# Patient Record
Sex: FEMALE | Race: WHITE | NOT HISPANIC OR LATINO | Employment: FULL TIME | URBAN - NONMETROPOLITAN AREA
[De-identification: names, ages, dates, MRNs, and addresses within clinical notes are randomized per-mention and may not be internally consistent; named-entity substitution may affect disease eponyms.]

---

## 2021-04-30 PROBLEM — F41.1 GENERALIZED ANXIETY DISORDER: Status: ACTIVE | Noted: 2021-04-30

## 2021-06-24 ENCOUNTER — TELEPHONE (OUTPATIENT)
Dept: INTERNAL MEDICINE | Facility: CLINIC | Age: 26
End: 2021-06-24

## 2021-06-24 DIAGNOSIS — F90.9 ATTENTION DEFICIT HYPERACTIVITY DISORDER (ADHD), UNSPECIFIED ADHD TYPE: Primary | ICD-10-CM

## 2021-06-24 NOTE — TELEPHONE ENCOUNTER
If it didn't start until 3 weeks after starting the medication then I don't think it is the medication. If it started when the medication was started then it could be. It sounds like we should add something for anxiety and stress. If she would like to add Pristiq daily I am happy to do that.

## 2021-06-24 NOTE — TELEPHONE ENCOUNTER
Pt states she has moments of clarity taking the ritalin but she has noticed it's Not really effective. she says she is irritable and impatient when the medication wears off. Wondering if maybe this isn't the best medication for her?     Patient also said she is going to do some research on the pristiq before she makes any decisions.     Please advise?

## 2021-06-24 NOTE — TELEPHONE ENCOUNTER
Patient states she started taking ritalin on June 3rd. She has been very emotional and could cry for any reason about 3 weeks after starting medication. She has been more anxious and paranoid when going out. She did not know if this medication was causing this or if this is unrelated.

## 2021-06-28 RX ORDER — LISDEXAMFETAMINE DIMESYLATE CAPSULES 20 MG/1
20 CAPSULE ORAL EVERY MORNING
Qty: 30 CAPSULE | Refills: 0 | Status: SHIPPED | OUTPATIENT
Start: 2021-06-28 | End: 2021-07-06

## 2023-04-25 ENCOUNTER — TRANSCRIBE ORDERS (OUTPATIENT)
Dept: LAB | Facility: HOSPITAL | Age: 28
End: 2023-04-25
Payer: COMMERCIAL

## 2023-04-25 ENCOUNTER — LAB (OUTPATIENT)
Dept: LAB | Facility: HOSPITAL | Age: 28
End: 2023-04-25
Payer: COMMERCIAL

## 2023-04-25 DIAGNOSIS — Z3A.28 28 WEEKS GESTATION OF PREGNANCY: Primary | ICD-10-CM

## 2023-04-25 DIAGNOSIS — Z34.83 PRENATAL CARE, SUBSEQUENT PREGNANCY, THIRD TRIMESTER: ICD-10-CM

## 2023-04-25 DIAGNOSIS — Z3A.28 28 WEEKS GESTATION OF PREGNANCY: ICD-10-CM

## 2023-04-25 LAB
BLD GP AB SCN SERPL QL: NEGATIVE
DEPRECATED RDW RBC AUTO: 40.9 FL (ref 37–54)
ERYTHROCYTE [DISTWIDTH] IN BLOOD BY AUTOMATED COUNT: 12.4 % (ref 12.3–15.4)
GLUCOSE 1H P 100 G GLC PO SERPL-MCNC: 138 MG/DL (ref 65–139)
GLUCOSE BLDC GLUCOMTR-MCNC: 142 MG/DL (ref 70–130)
HCT VFR BLD AUTO: 35.5 % (ref 34–46.6)
HGB BLD-MCNC: 12 G/DL (ref 12–15.9)
MCH RBC QN AUTO: 30 PG (ref 26.6–33)
MCHC RBC AUTO-ENTMCNC: 33.8 G/DL (ref 31.5–35.7)
MCV RBC AUTO: 88.8 FL (ref 79–97)
PLATELET # BLD AUTO: 212 10*3/MM3 (ref 140–450)
PMV BLD AUTO: 11.8 FL (ref 6–12)
RBC # BLD AUTO: 4 10*6/MM3 (ref 3.77–5.28)
WBC NRBC COR # BLD: 11.93 10*3/MM3 (ref 3.4–10.8)

## 2023-04-25 PROCEDURE — 85027 COMPLETE CBC AUTOMATED: CPT

## 2023-04-25 PROCEDURE — 36415 COLL VENOUS BLD VENIPUNCTURE: CPT

## 2023-04-25 PROCEDURE — 82962 GLUCOSE BLOOD TEST: CPT

## 2023-04-25 PROCEDURE — 86850 RBC ANTIBODY SCREEN: CPT

## 2023-04-25 PROCEDURE — 82962 GLUCOSE BLOOD TEST: CPT | Performed by: ADVANCED PRACTICE MIDWIFE

## 2023-04-25 PROCEDURE — 82950 GLUCOSE TEST: CPT

## 2023-07-20 ENCOUNTER — HOSPITAL ENCOUNTER (INPATIENT)
Facility: HOSPITAL | Age: 28
LOS: 2 days | Discharge: HOME | End: 2023-07-22
Attending: OBSTETRICS & GYNECOLOGY | Admitting: OBSTETRICS & GYNECOLOGY
Payer: COMMERCIAL

## 2023-07-20 PROBLEM — Z3A.40 40 WEEKS GESTATION OF PREGNANCY: Status: RESOLVED | Noted: 2023-07-20 | Resolved: 2023-07-20

## 2023-07-20 PROBLEM — Z3A.40 40 WEEKS GESTATION OF PREGNANCY: Status: ACTIVE | Noted: 2023-07-20

## 2023-07-20 LAB
ABO GROUP BLD: NORMAL
BLD GP AB SCN SERPL QL: NEGATIVE
DEPRECATED RDW RBC AUTO: 47.7 FL (ref 37–54)
ERYTHROCYTE [DISTWIDTH] IN BLOOD BY AUTOMATED COUNT: 14.7 % (ref 12.3–15.4)
HCT VFR BLD AUTO: 38.5 % (ref 34–46.6)
HGB BLD-MCNC: 12.8 G/DL (ref 12–15.9)
MCH RBC QN AUTO: 29.5 PG (ref 26.6–33)
MCHC RBC AUTO-ENTMCNC: 33.2 G/DL (ref 31.5–35.7)
MCV RBC AUTO: 88.7 FL (ref 79–97)
PLATELET # BLD AUTO: 185 10*3/MM3 (ref 140–450)
PMV BLD AUTO: 12.5 FL (ref 6–12)
RBC # BLD AUTO: 4.34 10*6/MM3 (ref 3.77–5.28)
RH BLD: POSITIVE
T&S EXPIRATION DATE: NORMAL
WBC NRBC COR # BLD: 11.16 10*3/MM3 (ref 3.4–10.8)

## 2023-07-20 PROCEDURE — 86900 BLOOD TYPING SEROLOGIC ABO: CPT | Performed by: OBSTETRICS & GYNECOLOGY

## 2023-07-20 PROCEDURE — 86850 RBC ANTIBODY SCREEN: CPT | Performed by: OBSTETRICS & GYNECOLOGY

## 2023-07-20 PROCEDURE — 36415 COLL VENOUS BLD VENIPUNCTURE: CPT | Performed by: OBSTETRICS & GYNECOLOGY

## 2023-07-20 PROCEDURE — 0KQM0ZZ REPAIR PERINEUM MUSCLE, OPEN APPROACH: ICD-10-PCS | Performed by: ADVANCED PRACTICE MIDWIFE

## 2023-07-20 PROCEDURE — 85027 COMPLETE CBC AUTOMATED: CPT | Performed by: OBSTETRICS & GYNECOLOGY

## 2023-07-20 PROCEDURE — 86901 BLOOD TYPING SEROLOGIC RH(D): CPT | Performed by: OBSTETRICS & GYNECOLOGY

## 2023-07-20 RX ORDER — ACETAMINOPHEN 325 MG/1
650 TABLET ORAL EVERY 6 HOURS PRN
Status: DISCONTINUED | OUTPATIENT
Start: 2023-07-20 | End: 2023-07-22 | Stop reason: HOSPADM

## 2023-07-20 RX ORDER — CARBOPROST TROMETHAMINE 250 UG/ML
250 INJECTION, SOLUTION INTRAMUSCULAR
Status: DISCONTINUED | OUTPATIENT
Start: 2023-07-20 | End: 2023-07-20 | Stop reason: HOSPADM

## 2023-07-20 RX ORDER — MISOPROSTOL 200 UG/1
800 TABLET ORAL ONCE AS NEEDED
Status: DISCONTINUED | OUTPATIENT
Start: 2023-07-20 | End: 2023-07-20 | Stop reason: HOSPADM

## 2023-07-20 RX ORDER — METHYLERGONOVINE MALEATE 0.2 MG/ML
200 INJECTION INTRAVENOUS ONCE AS NEEDED
Status: DISCONTINUED | OUTPATIENT
Start: 2023-07-20 | End: 2023-07-20 | Stop reason: HOSPADM

## 2023-07-20 RX ORDER — MAGNESIUM CARB/ALUMINUM HYDROX 105-160MG
30 TABLET,CHEWABLE ORAL ONCE
Status: DISCONTINUED | OUTPATIENT
Start: 2023-07-20 | End: 2023-07-20 | Stop reason: HOSPADM

## 2023-07-20 RX ORDER — LIDOCAINE HYDROCHLORIDE 10 MG/ML
5 INJECTION, SOLUTION EPIDURAL; INFILTRATION; INTRACAUDAL; PERINEURAL AS NEEDED
Status: DISCONTINUED | OUTPATIENT
Start: 2023-07-20 | End: 2023-07-20 | Stop reason: HOSPADM

## 2023-07-20 RX ORDER — IBUPROFEN 600 MG/1
600 TABLET ORAL EVERY 6 HOURS PRN
Status: DISCONTINUED | OUTPATIENT
Start: 2023-07-20 | End: 2023-07-22 | Stop reason: HOSPADM

## 2023-07-20 RX ORDER — OXYTOCIN/0.9 % SODIUM CHLORIDE 30/500 ML
PLASTIC BAG, INJECTION (ML) INTRAVENOUS
Status: DISCONTINUED
Start: 2023-07-20 | End: 2023-07-22 | Stop reason: HOSPADM

## 2023-07-20 RX ORDER — HYDROCORTISONE 25 MG/G
1 CREAM TOPICAL AS NEEDED
Status: DISCONTINUED | OUTPATIENT
Start: 2023-07-20 | End: 2023-07-22 | Stop reason: HOSPADM

## 2023-07-20 RX ORDER — PRENATAL WITH FERROUS FUM AND FOLIC ACID 3080; 920; 120; 400; 22; 1.84; 3; 20; 10; 1; 12; 200; 27; 25; 2 [IU]/1; [IU]/1; MG/1; [IU]/1; MG/1; MG/1; MG/1; MG/1; MG/1; MG/1; UG/1; MG/1; MG/1; MG/1; MG/1
1 TABLET ORAL DAILY
COMMUNITY

## 2023-07-20 RX ORDER — SODIUM CHLORIDE 0.9 % (FLUSH) 0.9 %
10 SYRINGE (ML) INJECTION EVERY 12 HOURS SCHEDULED
Status: DISCONTINUED | OUTPATIENT
Start: 2023-07-20 | End: 2023-07-20 | Stop reason: HOSPADM

## 2023-07-20 RX ORDER — BISACODYL 10 MG
10 SUPPOSITORY, RECTAL RECTAL DAILY PRN
Status: DISCONTINUED | OUTPATIENT
Start: 2023-07-21 | End: 2023-07-22 | Stop reason: HOSPADM

## 2023-07-20 RX ORDER — DOCUSATE SODIUM 100 MG/1
100 CAPSULE, LIQUID FILLED ORAL 2 TIMES DAILY
Status: DISCONTINUED | OUTPATIENT
Start: 2023-07-20 | End: 2023-07-22 | Stop reason: HOSPADM

## 2023-07-20 RX ORDER — FAMOTIDINE 10 MG/ML
20 INJECTION, SOLUTION INTRAVENOUS EVERY 12 HOURS PRN
Status: DISCONTINUED | OUTPATIENT
Start: 2023-07-20 | End: 2023-07-20 | Stop reason: HOSPADM

## 2023-07-20 RX ORDER — SODIUM CHLORIDE 0.9 % (FLUSH) 0.9 %
10 SYRINGE (ML) INJECTION AS NEEDED
Status: DISCONTINUED | OUTPATIENT
Start: 2023-07-20 | End: 2023-07-20 | Stop reason: HOSPADM

## 2023-07-20 RX ORDER — PRENATAL VIT/IRON FUM/FOLIC AC 27MG-0.8MG
1 TABLET ORAL DAILY
Status: DISCONTINUED | OUTPATIENT
Start: 2023-07-20 | End: 2023-07-22 | Stop reason: HOSPADM

## 2023-07-20 RX ORDER — OXYTOCIN/0.9 % SODIUM CHLORIDE 30/500 ML
250 PLASTIC BAG, INJECTION (ML) INTRAVENOUS CONTINUOUS
Status: DISPENSED | OUTPATIENT
Start: 2023-07-20 | End: 2023-07-20

## 2023-07-20 RX ORDER — SODIUM CHLORIDE 0.9 % (FLUSH) 0.9 %
1-10 SYRINGE (ML) INJECTION AS NEEDED
Status: DISCONTINUED | OUTPATIENT
Start: 2023-07-20 | End: 2023-07-22 | Stop reason: HOSPADM

## 2023-07-20 RX ORDER — ONDANSETRON 2 MG/ML
4 INJECTION INTRAMUSCULAR; INTRAVENOUS EVERY 6 HOURS PRN
Status: DISCONTINUED | OUTPATIENT
Start: 2023-07-20 | End: 2023-07-22 | Stop reason: HOSPADM

## 2023-07-20 RX ORDER — FAMOTIDINE 20 MG/1
20 TABLET, FILM COATED ORAL EVERY 12 HOURS PRN
Status: DISCONTINUED | OUTPATIENT
Start: 2023-07-20 | End: 2023-07-20 | Stop reason: HOSPADM

## 2023-07-20 RX ORDER — OXYTOCIN/0.9 % SODIUM CHLORIDE 30/500 ML
999 PLASTIC BAG, INJECTION (ML) INTRAVENOUS ONCE
Status: COMPLETED | OUTPATIENT
Start: 2023-07-20 | End: 2023-07-20

## 2023-07-20 RX ORDER — LIDOCAINE HYDROCHLORIDE 10 MG/ML
INJECTION, SOLUTION EPIDURAL; INFILTRATION; INTRACAUDAL; PERINEURAL
Status: DISCONTINUED
Start: 2023-07-20 | End: 2023-07-22 | Stop reason: HOSPADM

## 2023-07-20 RX ORDER — SODIUM CHLORIDE, SODIUM LACTATE, POTASSIUM CHLORIDE, CALCIUM CHLORIDE 600; 310; 30; 20 MG/100ML; MG/100ML; MG/100ML; MG/100ML
125 INJECTION, SOLUTION INTRAVENOUS CONTINUOUS
Status: DISCONTINUED | OUTPATIENT
Start: 2023-07-20 | End: 2023-07-20

## 2023-07-20 RX ADMIN — WITCH HAZEL 1 PAD: 500 SOLUTION RECTAL; TOPICAL at 16:03

## 2023-07-20 RX ADMIN — Medication 999 ML/HR: at 13:50

## 2023-07-20 RX ADMIN — PRENATAL VITAMINS-IRON FUMARATE 27 MG IRON-FOLIC ACID 0.8 MG TABLET 1 TABLET: at 16:03

## 2023-07-20 RX ADMIN — Medication: at 16:03

## 2023-07-20 RX ADMIN — DOCUSATE SODIUM 100 MG: 100 CAPSULE, LIQUID FILLED ORAL at 19:53

## 2023-07-20 RX ADMIN — Medication 1 APPLICATION: at 16:03

## 2023-07-20 RX ADMIN — IBUPROFEN 600 MG: 600 TABLET, FILM COATED ORAL at 14:52

## 2023-07-20 RX ADMIN — Medication 250 ML/HR: at 14:35

## 2023-07-20 NOTE — L&D DELIVERY NOTE
[facility]   Vaginal Delivery Note    Patient Name: Janet Francis  : 1995  MRN: [id number]    Date of Delivery: 2023     Diagnosis     Pre & Post-Delivery:  Intrauterine pregnancy at 40w2d  Labor status: Spontaneous Onset of Labor      (normal spontaneous vaginal delivery)             Problem List    Transfer to Postpartum     Review the Delivery Report for details.     Delivery     Delivery: Vaginal, Spontaneous     YOB: 2025    Time of Birth:  Gestational Age 1:39 PM   40w2d     Anesthesia: Local  - repair only   Delivering clinician: Gilmar Echols    Forceps?   No   Vacuum? No    Shoulder dystocia present: No        Delivery narrative:  Patient at 40w2d pushed to deliver a viable male infant over a second degree laceration with epidural anesthesia. Infant's head, tight anterior shoulder, and body delivered atraumatically. Vigorous infant was placed on mom's abdomen where the cord was allowed to stop pulsating and was clamped x2 and cut by FOB. Placenta delivered spontaneously and appeared to be intact. Laceration repaired with 3-0 Vicryl rapide and 3-0 Chromic. EBL 150ml. Mother and infant stable, bonding.         Infant     Findings: male  infant     Infant observations: Weight: 4235 g (9 lb 5.4 oz)   Length: 19  in  Observations/Comments:        Apgars: 7  @ 1 minute /    9  @ 5 minutes   Infant Name: Max     Placenta & Cord         Placenta delivered  Spontaneous  at   2023  1:49 PM     Cord: 3 vessels  present.   Nuchal Cord?  no   Cord blood obtained: Yes    Cord gases obtained:  No              Repair     Episiotomy: None         Lacerations: Yes  Laceration Information  Laceration Repaired?   Perineal: 2nd  Yes    Periurethral:       Labial:       Sulcus:       Vaginal:       Cervical:         Suture used for repair: 3-0 chromic gut, Vicryl rapide   Estimated Blood Loss:       Quantitative Blood Loss:          Complications     Terminal  meconium    Disposition     Mother to Mother Baby/Postpartum  in stable condition currently.  Baby to remains with mom  in stable condition currently.    Gilmar Echols CNM  07/20/23  20:12 EDT

## 2023-07-20 NOTE — PLAN OF CARE
Goal Outcome Evaluation:                        Problem: Breastfeeding  Goal: Effective Breastfeeding  Outcome: Ongoing, Progressing  Intervention: Promote Breast Care and Comfort  Flowsheets (Taken 7/20/2023 1630)  Breast Pumping: (has Spectra pump) --  Breast Care: Breastfeeding: nipple shield utilized  Intervention: Support Exclusive Breastfeeding Success  Flowsheets (Taken 7/20/2023 1630)  Supportive Measures:   active listening utilized   counseling provided   positive reinforcement provided  Breastfeeding Support:   lactation counseling provided   encouragement provided  Intervention: Promote Effective Breastfeeding  Flowsheets (Taken 7/20/2023 1630)  Breastfeeding Assistance:   assisted with positioning   feeding cue recognition promoted   feeding on demand promoted   infant latch-on verified   infant stimulated to wakeful state   nipple shield utilized   infant suck/swallow verified   hand expression verified   support offered  Parent/Child Attachment Promotion:   caring behavior modeled   cue recognition promoted   skin-to-skin contact encouraged   strengths emphasized   positive reinforcement provided

## 2023-07-20 NOTE — LACTATION NOTE
23 1630   Maternal Information   Date of Referral 23   Person Making Referral lactation consultant   Maternal Reason for Referral   (2nd time mother; nursed 1st child for 6 months)   Infant Reason for Referral  infant   Maternal Assessment   Breast Size Issue none   Breast Shape Bilateral:;round   Breast Density Bilateral:;soft   Nipples Bilateral:;short;other (see comments)  (marty slightly when stimulated; infant nursed on left breast for 10 minutes without using small nipple shield; right breast needed small nipple shield and nursed for 5 minutes)   Left Nipple Symptoms intact;nontender   Right Nipple Symptoms intact;nontender   Maternal Infant Feeding   Maternal Emotional State relaxed;receptive   Infant Positioning cross-cradle;clutch/football  (left; right)   Pain with Feeding no   Latch Assistance verbal guidance offered;minimal assistance   Support Person Involvement actively supporting mother   Milk Expression/Equipment   Breast Pump Type double electric, personal   Equipment for Home Use pump not needed at this time  (has personal Spectra)   Breast Pumping   Breast Pumping   (has Spectra pump)     Assisted mother with left cross cradle hold; mother has bilateral short nipples, but can marty slightly when stimulated; infant nursed for 10 minutes on left breast without nipple shield; however, needed small nipple shield on right football hold to maintain latch for 5 minutes; demonstrated deep latching and how to maintain that latch; encouraged lots of skin to skin; referred to breastfeeding pages in handbook; went over educational materials; has personal Spectra pump; encouraged to call lactation PRN or had questions/concerns.

## 2023-07-20 NOTE — H&P
27[facility]  Obstetric History and Physical    Chief Complaint   Patient presents with    Contractions       Subjective     Patient is a 27 y.o. female  currently at 40w2d, who presents for term labor  without ROM. She voices good fetal movement. She denies leaking of fluid. Is having some normal show. Plans no epidural for labor and birth.     Her prenatal care is benign.  Her previous obstetric/gynecological history is non-contributory.    The following portions of the patients history were reviewed and updated as appropriate: current medications, allergies, past medical history, past surgical history, past family history, past social history, and problem list .       Prenatal Information:   Maternal Prenatal Labs  Blood Type No results found for: ABO   Rh Status No results found for: RH   Antibody Screen No results found for: ABSCRN   Gonnorhea No results found for: GCCX   Chlamydia No results found for: CLAMYDCU   RPR No results found for: RPR   Syphilis Antibody No results found for: SYPHILIS   Rubella No results found for: RUBELLAIGGIN   Hepatitis B Surface Antigen No results found for: HEPBSAG   HIV-1 Antibody No results found for: LABHIV1   Hepatitis C Antibody No results found for: HEPCAB   Rapid Urin Drug Screen No results found for: AMPMETHU, BARBITSCNUR, LABBENZSCN, LABMETHSCN, LABOPIASCN, THCURSCR, COCAINEUR, AMPHETSCREEN, PROPOXSCN, BUPRENORSCNU, METAMPSCNUR, OXYCODONESCN, TRICYCLICSCN   Group B Strep Culture Negative                 Past OB History:       OB History    Para Term  AB Living   2 1 1 0 0 1   SAB IAB Ectopic Molar Multiple Live Births   0 0 0 0 0 1      # Outcome Date GA Lbr Reinaldo/2nd Weight Sex Delivery Anes PTL Lv   2 Current            1 Term 14 40w0d  3062 g (6 lb 12 oz) F Vag-Spont EPI N NAOMI      Birth Comments: Dr. Montenegro in [facility]       Past Medical History: Past Medical History:   Diagnosis Date    ADHD (attention deficit hyperactivity disorder)      Anxiety     Depression     Murmur       Past Surgical History Past Surgical History:   Procedure Laterality Date    TONSILLECTOMY        Family History: Family History   Problem Relation Age of Onset    Thyroid disease Mother     Heart attack Father     Heart disease Father     Diabetes Father     Hypertension Father     Hyperlipidemia Father     Alcohol abuse Father     Thyroid disease Other       Social History:  reports that she has never smoked. She has never used smokeless tobacco.   reports current alcohol use.   reports no history of drug use.        REVIEW OF SYSTEMS             Reports fetal movement is normal             Denies leakage of amniotic fluid.             Denies vaginal bleeding             She reports Regular contractions, frequency: Every 2-4 minutes, intensity moderate             All other systems reviewed and are negative    Objective       Vital Signs Range for the last 24 hours  Temperature:     Temp Source:     BP:     Pulse:     Respirations:     SPO2:     O2 Amount (l/min):     O2 Devices     Weight: Weight:  [73.5 kg (162 lb)] 73.5 kg (162 lb)       Presentation: vertex   Cervix: Exam by:  SHANNON Finney RN   Dilation:  6-7   Effacement: Cervical Effacement: 90%   Station:  0       Fetal Heart Rate Assessment   Method:     Beats/min: Fetal HR (beats/min): 125   Baseline: Fetal HR Baseline: indeterminate   Varibility: Fetal HR Variability: moderate (amplitude range 6 to 25 bpm)   Accels: Fetal HR Accelerations: absent   Decels: Fetal HR Decelerations: other (see comments)   Tracing Category:      NST: difficult trace with maternal movement     Uterine Assessment   Method: Method:  (unable to trace, toco adj)   Frequency (min):     Ctx Count in 10 min:     Duration:     Intensity:  strong   Intensity by IUPC:     Resting Tone:  Abd soft by palpation   Resting Tone by IUPC:     [place] Units:             Constitutional:  Well developed, well nourished, no acute distress, well-groomed.    Respiratory:  Resp e/e/u, normal breath sounds.   Cardiovascular:  Normal rate and rhythm, no murmurs.   Gastrointestinal:  Soft, gravid, nontender.  Uterus: Soft, nontender. Fundus appropriate for dates.  Neurologic:  Alert & oriented x 3,  no focal deficits noted.   Psychiatric:  Speech and behavior appropriate.   Extremities: no cyanosis, clubbing or edema, no evidence of DVT.        Labs:  Lab Results   Component Value Date    WBC 11.16 (H) 07/20/2023    HGB 12.8 07/20/2023    HCT 38.5 07/20/2023    MCV 88.7 07/20/2023     07/20/2023    AST 12 05/28/2021    ALT 11 05/28/2021               Assessment & Plan       40 weeks gestation of pregnancy        Assessment:  1.  Intrauterine pregnancy at 40w2d weeks gestation with reassuring fetal status.    2.   term labor  without ROM  3.  Obstetrical history is non-contributory.  4.  GBS status: Negative    Plan:  1.Expectant management  2. Plan of care has been reviewed with patient and questions answered.  3.  Risks, benefits of treatment plan have been discussed.  4.  All questions have been answered.        Gilmar Echols CNM  7/20/2023  12:41 EDT

## 2023-07-21 LAB
BASOPHILS # BLD AUTO: 0.03 10*3/MM3 (ref 0–0.2)
BASOPHILS NFR BLD AUTO: 0.2 % (ref 0–1.5)
DEPRECATED RDW RBC AUTO: 48.5 FL (ref 37–54)
EOSINOPHIL # BLD AUTO: 0.09 10*3/MM3 (ref 0–0.4)
EOSINOPHIL NFR BLD AUTO: 0.6 % (ref 0.3–6.2)
ERYTHROCYTE [DISTWIDTH] IN BLOOD BY AUTOMATED COUNT: 14.8 % (ref 12.3–15.4)
HCT VFR BLD AUTO: 35.9 % (ref 34–46.6)
HGB BLD-MCNC: 11.7 G/DL (ref 12–15.9)
IMM GRANULOCYTES # BLD AUTO: 0.12 10*3/MM3 (ref 0–0.05)
IMM GRANULOCYTES NFR BLD AUTO: 0.8 % (ref 0–0.5)
LYMPHOCYTES # BLD AUTO: 2.25 10*3/MM3 (ref 0.7–3.1)
LYMPHOCYTES NFR BLD AUTO: 15.7 % (ref 19.6–45.3)
MCH RBC QN AUTO: 29.1 PG (ref 26.6–33)
MCHC RBC AUTO-ENTMCNC: 32.6 G/DL (ref 31.5–35.7)
MCV RBC AUTO: 89.3 FL (ref 79–97)
MONOCYTES # BLD AUTO: 0.95 10*3/MM3 (ref 0.1–0.9)
MONOCYTES NFR BLD AUTO: 6.6 % (ref 5–12)
NEUTROPHILS NFR BLD AUTO: 10.9 10*3/MM3 (ref 1.7–7)
NEUTROPHILS NFR BLD AUTO: 76.1 % (ref 42.7–76)
NRBC BLD AUTO-RTO: 0 /100 WBC (ref 0–0.2)
PLATELET # BLD AUTO: 160 10*3/MM3 (ref 140–450)
PMV BLD AUTO: 12.5 FL (ref 6–12)
RBC # BLD AUTO: 4.02 10*6/MM3 (ref 3.77–5.28)
WBC NRBC COR # BLD: 14.34 10*3/MM3 (ref 3.4–10.8)

## 2023-07-21 PROCEDURE — 85025 COMPLETE CBC W/AUTO DIFF WBC: CPT | Performed by: ADVANCED PRACTICE MIDWIFE

## 2023-07-21 RX ADMIN — DOCUSATE SODIUM 100 MG: 100 CAPSULE, LIQUID FILLED ORAL at 20:41

## 2023-07-21 RX ADMIN — DOCUSATE SODIUM 100 MG: 100 CAPSULE, LIQUID FILLED ORAL at 08:28

## 2023-07-21 RX ADMIN — PRENATAL VITAMINS-IRON FUMARATE 27 MG IRON-FOLIC ACID 0.8 MG TABLET 1 TABLET: at 08:28

## 2023-07-21 RX ADMIN — IBUPROFEN 600 MG: 600 TABLET, FILM COATED ORAL at 20:41

## 2023-07-21 NOTE — PROGRESS NOTES
[facility]  Vaginal Delivery Progress Note    Subjective     PPD#1 . Feeling well. Tolerating regular diet. Voiding without difficulty. Pain controlled. Decreasing lochia. VSS. Afebrile. PP h/h normal.       Objective     Vital Signs Range for the last 24 hours  Temperature: Temp:  [97.5 °F (36.4 °C)-98.7 °F (37.1 °C)] 98.3 °F (36.8 °C)   Temp Source: Temp src: Oral   BP: BP: (113-145)/(63-80) 114/71   Pulse: Heart Rate:  [] 92   Respirations: Resp:  [16-18] 16   SPO2:     O2 Amount (l/min):     O2 Devices           Physical Exam:  General:  no acute distresss.  Abdomen: Soft, non-tender, fundus firm  Extremities: normal, atraumatic, no cyanosis, and trace edema.       Lab results reviewed:  Yes    Lab Results   Component Value Date    WBC 14.34 (H) 2023    HGB 11.7 (L) 2023    HCT 35.9 2023    MCV 89.3 2023     2023         Assessment & Plan        (normal spontaneous vaginal delivery)      Janet Francis is Day 1  post-partum       Plan:  Continue current care.      Shanique Drummond CNM  2023  08:28 EDT

## 2023-07-22 VITALS
TEMPERATURE: 98.3 F | WEIGHT: 162 LBS | HEART RATE: 81 BPM | SYSTOLIC BLOOD PRESSURE: 127 MMHG | HEIGHT: 62 IN | RESPIRATION RATE: 16 BRPM | DIASTOLIC BLOOD PRESSURE: 75 MMHG | BODY MASS INDEX: 29.81 KG/M2

## 2023-07-22 RX ORDER — IBUPROFEN 600 MG/1
600 TABLET ORAL EVERY 6 HOURS PRN
Qty: 60 TABLET | Refills: 1 | Status: SHIPPED | OUTPATIENT
Start: 2023-07-22

## 2023-07-22 RX ORDER — DOCUSATE SODIUM 100 MG/1
100 CAPSULE, LIQUID FILLED ORAL 2 TIMES DAILY PRN
Qty: 60 CAPSULE | Refills: 1 | Status: SHIPPED | OUTPATIENT
Start: 2023-07-22

## 2023-07-22 RX ADMIN — DOCUSATE SODIUM 100 MG: 100 CAPSULE, LIQUID FILLED ORAL at 08:36

## 2023-07-22 RX ADMIN — PRENATAL VITAMINS-IRON FUMARATE 27 MG IRON-FOLIC ACID 0.8 MG TABLET 1 TABLET: at 08:36

## 2023-07-22 NOTE — LACTATION NOTE
07/22/23 1045   Maternal Information   Person Making Referral patient   Infant Reason for Referral sleepy  (encouraged burping before breastfeeding, removing clothing for skin to skin contact, and stimulation throughout breastfeeding for quality milk transfer during breastfeeding session)   Maternal Infant Feeding   Additional Documentation Breastfeeding Supplementation (Group)   Breastfeeding Supplementation   Method of Supplementation paced bottle  (education discussed)   Breast Pumping   Breast Pumping Interventions post-feed pumping encouraged  (for short/missed feedings, if supplementation is required, or if breastfeeding becomes too painful, to encourage breastmilk production)     Difficulty latching overnight. Discussed strategies to assist with latching and continuing breastfeeding. Recommended pumping and paced bottle feeding for milk production. PRN Lactation Consultant/Clinic contact encouraged.

## 2023-07-22 NOTE — PROGRESS NOTES
[facility]  Vaginal Delivery Progress Note    Subjective     PPD#2 . Feeling well. Tolerating regular diet. Voiding without difficulty. Pain controlled. Decreasing lochia. VSS. Afebrile. PP h/h stable. Ready for discharge home today.      Objective     Vital Signs Range for the last 24 hours  Temperature: Temp:  [98 °F (36.7 °C)-98.7 °F (37.1 °C)] 98.3 °F (36.8 °C)   Temp Source: Temp src: Oral   BP: BP: (118-127)/(56-75) 127/75   Pulse: Heart Rate:  [79-91] 81   Respirations: Resp:  [16] 16   SPO2:     O2 Amount (l/min):     O2 Devices           Physical Exam:  General:  no acute distresss.  Abdomen: Soft, non-tender, fundus firm  Extremities: normal, atraumatic, no cyanosis, and trace edema.       Lab results reviewed:  Yes    Lab Results   Component Value Date    WBC 14.34 (H) 2023    HGB 11.7 (L) 2023    HCT 35.9 2023    MCV 89.3 2023     2023         Assessment & Plan        (normal spontaneous vaginal delivery)      Janet Francis is Day 2  post-partum       Plan:  Discharge home with standard precautions and return to clinic in 4-6 weeks.      Shanique Drummond CNM  2023  10:28 EDT

## 2023-07-22 NOTE — LACTATION NOTE
07/22/23 1320   Maternal Information   Person Making Referral patient   Maternal Reason for Referral breastfeeding currently   Maternal Assessment   Breast Size Issue none   Breast Shape Bilateral:;round   Breast Density Bilateral:;soft   Nipples Bilateral:;short   Left Nipple Symptoms tender;blisters   Right Nipple Symptoms tender;blisters   Maternal Infant Feeding   Maternal Emotional State independent;receptive;relaxed   Infant Positioning cross-cradle   Signs of Milk Transfer deep jaw excursions noted;other (see comments)  (during suckle assessment, infant noted to be chomping with gums on finger, weak suckle, lack of posterior suction)   Pain with Feeding other (see comments)  (tender, intermittently uncomfortable with nipple shield in place)   Comfort Measures Before/During Feeding infant position adjusted;latch adjusted;maternal position adjusted;suction broken using finger;other (see comments)  (small nipple shield with proper placement education/demonstrated completed)   Milk Ejection Reflex other (see comments)  (hand expression demonstrated, colostrum expressed easily)   Latch Assistance minimal assistance   Support Person Involvement actively supporting mother   Breastfeeding Supplementation   Method of Supplementation paced bottle   Nipple Used For Supplementation extra slow flow  (Transition Dr Tarango bottle provided)   Milk Expression/Equipment   Breast Pump Type double electric, personal   Breast Pumping   Breast Pumping Interventions post-feed pumping encouraged  (for short/missed feedings, if supplementation is required, or if breastfeeding becomes too painful, to encourage breastmilk production)   Lactation Referrals   Lactation Referrals outpatient lactation program  (encouraged folow up appointment)     All questions answered at this time. PRN Lactation Consultant/Clinic contact encouraged.

## 2023-07-22 NOTE — DISCHARGE SUMMARY
[facility]keon  Delivery Discharge Summary    Primary OB Clinician:     EDC: Estimated Date of Delivery: 23    Gestational Age:40w2d    Date of Admission: 2023    Admission Diagnosis:      Discharge Diagnosis: Same    Date of Delivery: 2023   Time of Delivery: 1:39 PM     Delivered By:  Gilmar Echols     Delivery Type: Vaginal, Spontaneous          Baby:male  infant;   Apgar:  7  @ 1 minute /   Apgar:  9  @ 5 minutes   Weight: 4235 g (9 lb 5.4 oz)    Length: 19     Anesthesia: Local      Laceration: Yes  Laceration Information  Laceration Repaired?   Perineal: 2nd  Yes    Periurethral:       Labial:       Sulcus:       Vaginal:       Cervical:         Suture used for repair: 2-0 chromic and 3-0 vcryl rapide   Exam:  Normal postpartum exam    Hospital Course:  Hospital course has been stable.  Patient is tolerating po, voiding without difficulty and ready for discharge.  Please see medication reconciliation and lab report for additional details.      Follow Up:  Patient has been given a follow up appointment in our office.     Discharge Date: 2023; Discharge Time: 10:31 EDT    Shanique Drummond CNM   10:30 EDT   23

## 2024-03-19 ENCOUNTER — OFFICE VISIT (OUTPATIENT)
Dept: INTERNAL MEDICINE | Facility: CLINIC | Age: 29
End: 2024-03-19
Payer: COMMERCIAL

## 2024-03-19 VITALS
HEART RATE: 97 BPM | SYSTOLIC BLOOD PRESSURE: 102 MMHG | OXYGEN SATURATION: 95 % | DIASTOLIC BLOOD PRESSURE: 70 MMHG | HEIGHT: 62 IN | WEIGHT: 121 LBS | BODY MASS INDEX: 22.26 KG/M2 | TEMPERATURE: 97.7 F

## 2024-03-19 DIAGNOSIS — G90.2 ACQUIRED LEFT-SIDED HORNER SYNDROME: ICD-10-CM

## 2024-03-19 DIAGNOSIS — H57.02 PUPILS OF DIFFERENT SIZE: Primary | ICD-10-CM

## 2024-03-19 DIAGNOSIS — R59.0 LYMPHADENOPATHY, AXILLARY: ICD-10-CM

## 2024-03-19 PROCEDURE — 99214 OFFICE O/P EST MOD 30 MIN: CPT | Performed by: NURSE PRACTITIONER

## 2024-03-19 NOTE — PROGRESS NOTES
"  Office Visit      Patient Name: Janet Francis  : 1995   MRN: [id number]   Care Team: Patient Care Team:  Mary Matias APRN as PCP - General (Family Medicine)  Sydney Rowe LPCC as Counselor (Behavioral Health)  Yaneli Denise APRN as Nurse Practitioner (Behavioral Health)    Chief Complaint  other (States that her pupils are different in size. Since roughly January /States that she has a nodule under left arm.  Present since last . )    Subjective     Subjective      Janet Francis presents to [facility] for pupil concern and lump under left arm.   Here today to establish care with above complaints.   Was told by a co-worker a few months ago pupils were two different sizes and someone else noticed this problem last week. She has never really noticed a sudden change in pupillary size but now that she pays attention can see the discrepancy as well. She has a photo that goes back to  (4 years) that shows similar findings.   Denies vision changes, recent eye exam, ocular trauma, head trauma prior to symptom onset, headaches, and gait disturbances.  Never been worked up for this problem before.     She is also complaining of a nodule of the left underarm.   Noticed when she was pregnant about 9-10 months ago. She is currently breastfeeding. This has never went away even when her breasts are emptied.   She endorses swelling of the left underarm with associated tenderness.   No current erythema, warmth, or breast lump.   She did speak with her midwife about this when she was pregnant, discussed possible antibiotics but she never actually was treated with this. Symptoms are not changing but are also not improving.     Objective     Objective   Vital Signs:   /70   Pulse 97   Temp 97.7 °F (36.5 °C) (Tympanic)   Ht 157.5 cm (62\")   Wt 54.9 kg (121 lb)   SpO2 95%   BMI 22.13 kg/m²     Physical Exam  Vitals and nursing note reviewed. "   Constitutional:       General: She is not in acute distress.     Appearance: Normal appearance. She is not toxic-appearing.   Eyes:      Extraocular Movements: Extraocular movements intact.      Pupils: Pupils are unequal.      Right eye: Pupil is round, reactive and not sluggish.      Left eye: Pupil is sluggish (smaller in size in comparison to right pupil). Pupil is round and reactive.      Comments: No nystagmus noted  Pitosis left     Neck:      Vascular: No carotid bruit.   Cardiovascular:      Rate and Rhythm: Normal rate and regular rhythm.      Heart sounds: Normal heart sounds. No murmur heard.  Pulmonary:      Effort: Pulmonary effort is normal. No respiratory distress.      Breath sounds: Normal breath sounds. No wheezing.   Chest:   Breasts:     Right: Normal.      Left: Normal.   Abdominal:      General: Bowel sounds are normal. There is no distension.      Palpations: Abdomen is soft.      Tenderness: There is no abdominal tenderness.   Musculoskeletal:         General: Normal range of motion.      Cervical back: Neck supple. No tenderness.   Lymphadenopathy:      Upper Body:      Left upper body: Axillary adenopathy (tender to palpation) present.   Skin:     General: Skin is warm and dry.      Findings: No rash.   Neurological:      General: No focal deficit present.      Mental Status: She is alert and oriented to person, place, and time.      Motor: No weakness.      Coordination: Coordination normal.      Gait: Gait normal.   Psychiatric:         Mood and Affect: Mood normal.         Behavior: Behavior normal.          Assessment / Plan      Assessment & Plan   Problem List Items Addressed This Visit    None  Visit Diagnoses       Pupils of different size    -  Primary    Relevant Orders    MRI brain w contrast    Lymphadenopathy, axillary        Relevant Orders    US Nonvascular Extremity Limited    Consistent with above, would expect if directly related to breast feeding would improve with  feedings but it is not. Warm compress PRN and ultrasound ordered.     Acquired left-sided Junior syndrome        Relevant Orders    MRI brain w contrast    Symptoms are classic of above. Further work up with MRI and recommend formal eye exam which she plans to schedule. Unclear as to whether or not this was acquired vs present in childhood. Reassuring has been present for at least 4 years without other symptoms. Follow-up after imaging.           BMI is within normal parameters. No other follow-up for BMI required.      Follow Up   Return in about 8 weeks (around 5/14/2024) for Annual.  Patient was given instructions and counseling regarding her condition or for health maintenance advice. Please see specific information pulled into the AVS if appropriate.     ALFRED Doherty  [facility] - [place]

## 2024-04-25 ENCOUNTER — HOSPITAL ENCOUNTER (OUTPATIENT)
Dept: MRI IMAGING | Facility: HOSPITAL | Age: 29
Discharge: HOME | End: 2024-04-25
Admitting: NURSE PRACTITIONER
Payer: COMMERCIAL

## 2024-04-25 DIAGNOSIS — H57.02 PUPILS OF DIFFERENT SIZE: ICD-10-CM

## 2024-04-25 DIAGNOSIS — G90.2 ACQUIRED LEFT-SIDED HORNER SYNDROME: ICD-10-CM

## 2024-04-25 PROCEDURE — 0 GADOBENATE DIMEGLUMINE 529 MG/ML SOLUTION: Performed by: NURSE PRACTITIONER

## 2024-04-25 PROCEDURE — 70553 MRI BRAIN STEM W/O & W/DYE: CPT

## 2024-04-25 PROCEDURE — A9577 INJ MULTIHANCE: HCPCS | Performed by: NURSE PRACTITIONER

## 2024-04-25 RX ADMIN — GADOBENATE DIMEGLUMINE 10 ML: 529 INJECTION, SOLUTION INTRAVENOUS at 10:34

## 2024-05-20 ENCOUNTER — HOSPITAL ENCOUNTER (OUTPATIENT)
Dept: ULTRASOUND IMAGING | Facility: HOSPITAL | Age: 29
Discharge: HOME | End: 2024-05-20
Admitting: NURSE PRACTITIONER
Payer: COMMERCIAL

## 2024-05-20 DIAGNOSIS — R59.0 LYMPHADENOPATHY, AXILLARY: ICD-10-CM

## 2024-05-20 PROCEDURE — 76882 US LMTD JT/FCL EVL NVASC XTR: CPT

## 2024-05-24 ENCOUNTER — TELEPHONE (OUTPATIENT)
Dept: INTERNAL MEDICINE | Facility: CLINIC | Age: 29
End: 2024-05-24
Payer: COMMERCIAL

## 2024-05-24 DIAGNOSIS — R59.0 LYMPHADENOPATHY, AXILLARY: Primary | ICD-10-CM

## 2024-05-24 NOTE — TELEPHONE ENCOUNTER
Spoke with patient, had questions about what would have to happen on the next ultrasound for a biopsy to be done. Advised that they will be looking for any change from the previous ultrasound. Patient states understanding and will call back with any questions.

## 2024-05-24 NOTE — TELEPHONE ENCOUNTER
"Relay     \"Lymph node is enlarged without significant suspicious features. Radiologist is recommending a follow-up ultrasound in 8 weeks. Where this problem has been going on for so long I am going to go ahead and refer you to the general surgery group to get it looked at. They will be able to repeat the ultrasound in their office potentially and intervene by taking a sample if that is needed. You will get a call to schedule. \"                "

## 2024-05-24 NOTE — TELEPHONE ENCOUNTER
"Name: Janet Francis      Relationship: Self      Best Callback Number:       HUB PROVIDED THE RELAY MESSAGE FROM THE OFFICE  Relay      \"Lymph node is enlarged without significant suspicious features. Radiologist is recommending a follow-up ultrasound in 8 weeks. Where this problem has been going on for so long I am going to go ahead and refer you to the general surgery group to get it looked at. They will be able to repeat the ultrasound in their office potentially and intervene by taking a sample if that is needed. You will get a call to schedule. \"                  PATIENT: WANTED TO SPEAK TO THE OFFICE-TRANSFERRED     ADDITIONAL INFORMATION:    "

## 2024-06-04 NOTE — PROGRESS NOTES
Patient: Janet Francis    YOB: 1995    Date: 06/10/2024    Primary Care Provider: Mary Matias APRN    Chief Complaint   Patient presents with    Adenopathy     axilla       SUBJECTIVE:    History of present illness:  The patient is in the office today for evaluation and treatment of left axillary lymphadenopathy. She states that she notice a nodule in left axilla about a year ago when her milk was coming in. She states that she has swelling and tenderness of left axilla. She is currently breastfeeding. Ultrasound was performed on 5/20/24 which showed 15 mm lymph node in region of palpable abnormality. Cortex is mildly prominent. 8-week follow-up ultrasound is recommended. She states she had something similar to this with her last pregnancy, but never had it looked into and eventually it went away. No family history of lymphoma. She denies breast lesions or breast cancer in her immediate family.     The following portions of the patient's history were reviewed and updated as appropriate: allergies, current medications, past family history, past medical history, past social history, past surgical history and problem list.      Review of Systems:  Constitutional:  Negative for chills, fever, and unexpected weight change.  HENT: Negative for trouble swallowing and voice change.  Eyes:  Negative for visual disturbance.  Respiratory:  Negative for apnea, cough, chest tightness, shortness of breath, and wheezing.  Cardiovascular:  Negative for chest pain, palpitations, and leg swelling.  Gastrointestinal:  Negative for abdominal distention, abdominal pain, anal bleeding, blood in stool, constipation, diarrhea, nausea, rectal pain, and vomiting.  Musculoskeletal:  Negative for back pain, gait problem, and joint swelling.  Skin:  Left axilla nodule with tenderness  Neurological:  Negative for dizziness, syncope, speech difficulty, weakness, numbness, and headaches.  Hematological:  Negative for  "adenopathy.  Does not bruise/bleed easily.  Psychiatric/Behavioral:  Negative for confusion.  The patient is not nervous/anxious.      Allergies:  No Known Allergies    Medications:    Current Outpatient Medications:     Prenatal Vit-Fe Fumarate-FA (Prenatal 27-1) 27-1 MG tablet tablet, Take 1 tablet by mouth Daily. (Patient not taking: Reported on 6/7/2024), Disp: , Rfl:     History:  Past Medical History:   Diagnosis Date    ADHD (attention deficit hyperactivity disorder)     Anxiety     Depression     Murmur        Past Surgical History:   Procedure Laterality Date    TONSILLECTOMY         Family History   Problem Relation Age of Onset    Thyroid disease Mother     Heart attack Father     Heart disease Father     Diabetes Father     Hypertension Father     Hyperlipidemia Father     Alcohol abuse Father     Thyroid disease Other        Social History     Tobacco Use    Smoking status: Never     Passive exposure: Never    Smokeless tobacco: Never   Vaping Use    Vaping status: Never Used   Substance Use Topics    Alcohol use: Not Currently     Comment: 1 or less a week    Drug use: No        OBJECTIVE:    Vital Signs:   Vitals:    06/10/24 1305   BP: 122/68   Pulse: 90   Resp: 20   Temp: 97.5 °F (36.4 °C)   TempSrc: Temporal   SpO2: 99%   Weight: 60.2 kg (132 lb 12.8 oz)   Height: 157.5 cm (62\")       Physical Exam:   Physical Exam:     General Appearance:    Alert, cooperative, in no acute distress   Head:    Normocephalic, without obvious abnormality, atraumatic   Eyes:            Lids and lashes normal, conjunctivae and sclerae normal, no   icterus, no pallor, corneas clear, PERRLA   Throat:   No oral lesions, no thrush, oral mucosa moist   Lungs:     Clear to auscultation,respirations regular, even and                  unlabored    Heart:    Regular rhythm and normal rate, normal S1 and S2, no            murmur, no gallop, no rub, no click   Abdomen:     Normal bowel sounds, no masses, no organomegaly, soft     "    non-tender, non-distended, no guarding   Extremities:   Moves all extremities well, no edema, no cyanosis, no             redness   Pulses:   Pulses palpable and equal bilaterally   Skin:   Left axillary small palpable lymph node with associated tenderness. No skin abnormalities.   Neurologic:   Cranial nerves 2 - 12 grossly intact, sensation intact, DTR       present and equal bilaterally       Results Review:   I reviewed the patient's new clinical results.  I reviewed the patient's new imaging results and agree with the interpretation.    Review of Systems was reviewed and confirmed as accurate as documented by the MA.    ASSESSMENT/PLAN:    1. Lymphadenopathy, axillary      Patient was referred to me for persistent left axillary lymphadenopathy that has been present for about a year. She did have an ultrasound completed that showed one prominent lymph node. We will proceed with an axillary lymph node FNA today with ultrasound guidance. The procedure and risks including bleeding, infection, damage to surrounding structures, and need for additional procedures was discussed. Patient is agreeable and wishes to proceed. All questions were answered.     Electronically signed by Laurie Lares DO  06/10/24    Procedure:    I recommend a FNA of the left axillary lymph node. The procedure and risks were clearly explained including bleeding, infection and requirement for re-biopsy and the patient understood these and wishes to proceed.    The patient was brought to the procedure room. Consent and time out were performed. The area was prepped and draped in the usual fashion. 1% lidocaine with epinephrine was infused locally. A 20G needle was used to biopsy the lesion with ultrasound guidance.  Minimal blood loss had occurred and hemostasis had been well controlled with pressure.  The patient tolerated the procedure and there were no complications. I will call her with her results.

## 2024-06-07 ENCOUNTER — OFFICE VISIT (OUTPATIENT)
Dept: INTERNAL MEDICINE | Facility: CLINIC | Age: 29
End: 2024-06-07
Payer: COMMERCIAL

## 2024-06-07 VITALS
HEIGHT: 62 IN | WEIGHT: 130 LBS | BODY MASS INDEX: 23.92 KG/M2 | RESPIRATION RATE: 16 BRPM | SYSTOLIC BLOOD PRESSURE: 112 MMHG | TEMPERATURE: 97.3 F | DIASTOLIC BLOOD PRESSURE: 80 MMHG | OXYGEN SATURATION: 100 % | HEART RATE: 93 BPM

## 2024-06-07 DIAGNOSIS — R59.0 LYMPHADENOPATHY, AXILLARY: ICD-10-CM

## 2024-06-07 DIAGNOSIS — Z00.00 ANNUAL PHYSICAL EXAM: Primary | ICD-10-CM

## 2024-06-07 DIAGNOSIS — G90.2 ACQUIRED LEFT-SIDED HORNER SYNDROME: ICD-10-CM

## 2024-06-07 PROCEDURE — 99395 PREV VISIT EST AGE 18-39: CPT | Performed by: NURSE PRACTITIONER

## 2024-06-07 NOTE — PROGRESS NOTES
Subjective   Janet Francis is a 28 y.o. female and is here for a comprehensive physical exam. The patient reports no problems.    HPI:  Patient reports to clinic today for annual physical. She has no acute complaints. Endorses she had her TDAP last year while pregnant. Denies shortness of air, fatigue, headaches, chest pain. She does have a known enlarged lymph node in left axilla region that was imaged via ultrasound in May, will follow up with Dr. Lares on Fiona 10.     Health Habits:  Eye exam within last 2 years? No.   Dental exam every 6 months? No.  Exercise habits: No structured exercise regimen, minimal exercise.   Healthy diet? Follows typical American diet.    The ASCVD Risk score (Sharmin DK, et al., 2019) failed to calculate for the following reasons:    The 2019 ASCVD risk score is only valid for ages 40 to 79        Do you take any herbs or supplements that were not prescribed by a doctor? no  Are you taking calcium supplements? No  Are you taking aspirin daily? No     History:  LMP: No LMP recorded.  Menopause: No  Last pap date:   Abnormal pap? no  Family history of breast or ovarian cancer: no    OB History    Para Term  AB Living   2 2 2     2   SAB IAB Ectopic Molar Multiple Live Births           0 2      # Outcome Date GA Lbr Reinaldo/2nd Weight Sex Type Anes PTL Lv   2 Term 23 40w2d  4235 g (9 lb 5.4 oz) M Vag-Spont Local N NAOMI   1 Term 14 40w0d  3062 g (6 lb 12 oz) F Vag-Spont EPI N NAOMI      Birth Comments: Dr. Monten[place] in [place]     Sexual activity questions deferred to the physician.  The following portions of the patient's history were reviewed and updated as appropriate: She  has a past medical history of ADHD (attention deficit hyperactivity disorder), Anxiety, Depression, and Murmur.  She does not have any pertinent problems on file.  She  has a past surgical history that includes Tonsillectomy.  Her family history includes Alcohol abuse in her  "father; Diabetes in her father; Heart attack in her father; Heart disease in her father; Hyperlipidemia in her father; Hypertension in her father; Thyroid disease in her mother and another family member.  She  reports that she has never smoked. She has never been exposed to tobacco smoke. She has never used smokeless tobacco. She reports that she does not currently use alcohol. She reports that she does not use drugs.  Current Outpatient Medications   Medication Sig Dispense Refill    Prenatal Vit-Fe Fumarate-FA (Prenatal 27-1) 27-1 MG tablet tablet Take 1 tablet by mouth Daily. (Patient not taking: Reported on 6/7/2024)       No current facility-administered medications for this visit.     Review of Systems  Do you have pain that bothers you in your daily life? no    Objective   /80 (BP Location: Right arm, Patient Position: Sitting, Cuff Size: Small Adult)   Pulse 93   Temp 97.3 °F (36.3 °C)   Resp 16   Ht 157.5 cm (62\")   Wt 59 kg (130 lb)   SpO2 100%   BMI 23.78 kg/m²     Physical Exam  Vitals and nursing note reviewed.   Constitutional:       General: She is not in acute distress.     Appearance: Normal appearance.   HENT:      Right Ear: Tympanic membrane and ear canal normal.      Left Ear: Tympanic membrane and ear canal normal.      Nose: Nose normal.      Mouth/Throat:      Mouth: Mucous membranes are moist.      Pharynx: Oropharynx is clear. No posterior oropharyngeal erythema.   Eyes:      Extraocular Movements: Extraocular movements intact.      Pupils: Pupils are unequal.      Right eye: Pupil is round and reactive.      Left eye: Pupil is round and reactive.      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    Neck:      Thyroid: No thyroid mass or thyromegaly.      Vascular: No carotid bruit.   Cardiovascular:      Rate and Rhythm: Normal rate and regular rhythm.      Pulses: Normal pulses.      Heart sounds: Normal heart sounds. No murmur " heard.  Pulmonary:      Effort: Pulmonary effort is normal.      Breath sounds: Normal breath sounds. No wheezing.   Abdominal:      General: Abdomen is flat. Bowel sounds are normal. There is no distension.      Palpations: Abdomen is soft. There is no mass.      Tenderness: There is no abdominal tenderness.   Musculoskeletal:         General: No deformity. Normal range of motion.      Cervical back: Normal range of motion and neck supple. No muscular tenderness.   Lymphadenopathy:      Head:      Right side of head: No submandibular, tonsillar, preauricular or posterior auricular adenopathy.      Left side of head: No submandibular, tonsillar, preauricular or posterior auricular adenopathy.      Cervical: No cervical adenopathy.   Skin:     General: Skin is warm and dry.      Capillary Refill: Capillary refill takes less than 2 seconds.      Findings: No bruising or rash.   Neurological:      General: No focal deficit present.      Mental Status: She is alert and oriented to person, place, and time.      Gait: Gait normal.      Deep Tendon Reflexes: Reflexes normal.   Psychiatric:         Mood and Affect: Mood normal.         Behavior: Behavior normal.         Thought Content: Thought content normal.         Judgment: Judgment normal.       Assessment & Plan   Healthy female exam.    Diagnosis Plan   1. Annual physical exam  Preventative maintenance discussed during visit and information provided in AVS.       2. Acquired left-sided Junior syndrome  MRI with no significant findings. Discussed with patient today. Follow-up with ophthalmology for yearly eye exams.       3. Lymphadenopathy, axillary  Keep follow-up with General surgery, present >6 months.           2. Patient Counseling:  --Nutrition: Stressed importance of moderation in sodium/caffeine intake, saturated fat and cholesterol, caloric balance, sufficient intake of fresh fruits, vegetables, fiber, calcium, iron, and 1 g folate supplementation if of  childbearing age.   --Discussed the issue of calcium supplement, and the daily use of baby aspirin if applicable.             --Mammogram recommended every 2 years from age 40-49 and yearly beginning at age 50.  --Exercise: Stressed the importance of regular exercise.   --Substance Abuse: Discussed cessation/primary prevention of tobacco (if applicable), alcohol, or other drug use (if applicable); driving or other dangerous activities under the influence; availability of treatment for abuse.    --Sexuality: Discussed sexually transmitted diseases, partner selection, use of condoms, avoidance of unintended pregnancy  and contraceptive alternatives.   --Injury prevention: Discussed safety belts, safety helmets, smoke detector, smoking near bedding or upholstery.   --Dental health: Discussed importance of regular tooth brushing, flossing, and dental visits every 6 months.  --Immunizations reviewed.  --Discussed benefits of screening colonoscopy (if applicable).  --After hours service discussed with patient  3. Discussed the patient's BMI with her.  The BMI is in the acceptable range  BMI is within normal parameters. No other follow-up for BMI required.   This note accurately reflects work and decisions made by me.  4. Return in about 1 year (around 6/7/2025) for Annual.  ALFRED Rebollar Student/ALFRED Doherty  06/07/2024  15:18 EDT

## 2024-06-10 ENCOUNTER — OFFICE VISIT (OUTPATIENT)
Dept: SURGERY | Facility: CLINIC | Age: 29
End: 2024-06-10
Payer: COMMERCIAL

## 2024-06-10 VITALS
OXYGEN SATURATION: 99 % | WEIGHT: 132.8 LBS | RESPIRATION RATE: 20 BRPM | HEART RATE: 90 BPM | TEMPERATURE: 97.5 F | BODY MASS INDEX: 24.44 KG/M2 | HEIGHT: 62 IN | SYSTOLIC BLOOD PRESSURE: 122 MMHG | DIASTOLIC BLOOD PRESSURE: 68 MMHG

## 2024-06-10 DIAGNOSIS — R59.0 LYMPHADENOPATHY, AXILLARY: Primary | ICD-10-CM

## 2024-06-10 PROCEDURE — 10005 FNA BX W/US GDN 1ST LES: CPT | Performed by: STUDENT IN AN ORGANIZED HEALTH CARE EDUCATION/TRAINING PROGRAM

## 2024-06-10 PROCEDURE — 99204 OFFICE O/P NEW MOD 45 MIN: CPT | Performed by: STUDENT IN AN ORGANIZED HEALTH CARE EDUCATION/TRAINING PROGRAM

## 2024-06-11 ENCOUNTER — PATIENT ROUNDING (BHMG ONLY) (OUTPATIENT)
Dept: SURGERY | Facility: CLINIC | Age: 29
End: 2024-06-11
Payer: COMMERCIAL

## 2024-06-11 LAB — REF LAB TEST METHOD: NORMAL

## 2024-06-11 NOTE — PROGRESS NOTES
June 11, 2024    Hello, may I speak with Janet Francis?    My name is Go Roe    I am  with [facility]  [address]  [place] [address] [phone].    Before we get started may I verify your date of birth? 1995    I am calling to officially welcome you to our practice and ask about your recent visit. Is this a good time to talk? yes    Tell me about your visit with us. What things went well? Everything went well.       We're always looking for ways to make our patients' experiences even better. Do you have recommendations on ways we may improve?  no    Overall were you satisfied with your first visit to our practice? yes       I appreciate you taking the time to speak with me today. Is there anything else I can do for you? no      Thank you, and have a great day.

## 2024-06-12 ENCOUNTER — TELEPHONE (OUTPATIENT)
Dept: SURGERY | Facility: CLINIC | Age: 29
End: 2024-06-12
Payer: COMMERCIAL

## 2024-06-12 DIAGNOSIS — R59.0 LYMPHADENOPATHY, AXILLARY: Primary | ICD-10-CM

## 2024-06-12 NOTE — TELEPHONE ENCOUNTER
Called to discuss pathology results with patient. Pathology demonstrated polymorphous lymphoid tissue. This is a relatively non-specific finding. Patient does not have complaints that would make me concerned for a lymphoma at this point. We will order a repeat ultrasound for 8 weeks to evaluate improvement. If it is still persistent, patient may require a lymphadenectomy to further delineate pathology. She expresses understanding and all questions were answered.

## 2024-06-20 ENCOUNTER — TELEPHONE (OUTPATIENT)
Dept: SURGERY | Facility: CLINIC | Age: 29
End: 2024-06-20
Payer: COMMERCIAL

## 2024-06-20 NOTE — TELEPHONE ENCOUNTER
"Pt called the office regarding \"ultrasound of left axillary lymph node,\" she stated \"my ultrasound is scheduled for 08/19/2024 and I need to know if that date is ok.\"  "

## 2025-02-14 ENCOUNTER — TELEPHONE (OUTPATIENT)
Dept: SURGERY | Facility: CLINIC | Age: 30
End: 2025-02-14
Payer: COMMERCIAL

## 2025-02-14 ENCOUNTER — TELEPHONE (OUTPATIENT)
Dept: INTERNAL MEDICINE | Facility: CLINIC | Age: 30
End: 2025-02-14
Payer: COMMERCIAL

## 2025-02-14 DIAGNOSIS — R59.0 LYMPHADENOPATHY, AXILLARY: Primary | ICD-10-CM

## 2025-02-14 NOTE — TELEPHONE ENCOUNTER
Caller: Janet Francis    Relationship: Self    Best call back number: [phone]     What orders are you requesting (i.e. lab or imaging): ULTRASOUND OF SWOLLEN LYMPH NODE    In what timeframe would the patient need to come in: AS SOON AS POSSIBLE    Where will you receive your lab/imaging services: [facility]    Additional notes: THE ULTRASOUND DEPARTMENT NEEDS A NEW ORDER. PLEASE SUBMIT.    NOTIFY PATIENT WHEN IN.   05-Jun-2020

## 2025-02-14 NOTE — TELEPHONE ENCOUNTER
Patient called requesting an order for thyroid ultrasound. She missed her last appointment and it .

## 2025-02-17 DIAGNOSIS — R59.0 LYMPHADENOPATHY, AXILLARY: Primary | ICD-10-CM

## 2025-02-17 NOTE — TELEPHONE ENCOUNTER
Spoke to patient, advised her that Dr. Lares ordered the left axillary ultrasound. She understood.

## 2025-04-14 ENCOUNTER — HOSPITAL ENCOUNTER (OUTPATIENT)
Dept: ULTRASOUND IMAGING | Facility: HOSPITAL | Age: 30
Discharge: HOME | End: 2025-04-14
Admitting: STUDENT IN AN ORGANIZED HEALTH CARE EDUCATION/TRAINING PROGRAM
Payer: COMMERCIAL

## 2025-04-14 DIAGNOSIS — R59.0 LYMPHADENOPATHY, AXILLARY: ICD-10-CM

## 2025-04-14 PROCEDURE — 76882 US LMTD JT/FCL EVL NVASC XTR: CPT

## 2025-04-18 ENCOUNTER — TELEPHONE (OUTPATIENT)
Dept: SURGERY | Facility: CLINIC | Age: 30
End: 2025-04-18
Payer: COMMERCIAL

## 2025-04-18 NOTE — TELEPHONE ENCOUNTER
CALLED [facility] FOR PATIENT TO CALL OFFICE TO MAKE A FOLLOW UP ON  AXILLARY ULTRA SOUND. APPOINTMENT NEEDS TO BE WITH DR DONALD ON A MONDAY OR TUESDAY WHEN WE HAVE ULTRA SOUND IN OFFICE.

## 2025-05-02 NOTE — PROGRESS NOTES
Patient: Janet Francis  YOB: 1995    Date: 04/28/2025    Primary Care Provider: Mary Matias APRN    Chief Complaint   Patient presents with    Follow-up     left axilla       History: The patient is in the office today in follow up from a left axillary ultrasound performed on 4/14/25. Ultrasound did show lymph node swelling in left axilla x 1.5 years ago. Previously had FNA 6/12/24 that showed polymorphous lymphoid tissue, but can not rule out lymphoma. Patient was supposed to have US to follow up 8 weeks later, but instead had it 4/14/25. Patient states that she can no longer feel the swollen node. She is still breast feeding and plans to stop in July. She had a history of axillary lymphadenopathy when she breast fed her last child and this improved spontaneously after stopping breastfeeding. She denies unintentional weight loss, fevers, recent illnesses, night sweats. No family history of lymphoma.     3  lymph nodes visualized donovan andreea 0.98 x 0.69 x 0.45 cm   1.66 x 1.59 x 0.76 cm   2.42 x 1.05 x 1.08cm   Mild left axillary adenopathy is present. The largest node  measures 24 x 10 x 11 mm. A second node is seen measuring 17 x 16 x 8 mm. Mild adenopathy as above.    The following portions of the patient's history were reviewed and updated as appropriate: allergies, current medications, past family history, past medical history, past social history, past surgical history and problem list.      Review of Systems:  Constitutional:  Negative for chills, fever, and unexpected weight change.  HENT: Negative for trouble swallowing and voice change.  Eyes:  Negative for visual disturbance.  Respiratory:  Negative for apnea, cough, chest tightness, shortness of breath, and wheezing.  Cardiovascular:  Negative for chest pain, palpitations, and leg swelling.  Gastrointestinal:  Negative for abdominal distention, abdominal pain, anal bleeding, blood in stool, constipation, diarrhea, nausea, rectal  "pain, and vomiting.  Musculoskeletal:  Negative for back pain, gait problem, and joint swelling.  Skin:  Negative for color change, rash, and wound  Neurological:  Negative for dizziness, syncope, speech difficulty, weakness, numbness, and headaches.  Hematological:  Negative for adenopathy.  Does not bruise/bleed easily.  Psychiatric/Behavioral:  Negative for confusion.  The patient is not nervous/anxious.          Vital Signs  Vitals:    05/05/25 1449   BP: 118/64   Pulse: 90   Resp: 18   Temp: 96.8 °F (36 °C)   TempSrc: Temporal   SpO2: 98%   Weight: 64 kg (141 lb 1.6 oz)   Height: 157.5 cm (62\")       Allergies:  No Known Allergies    Medications:    Current Outpatient Medications:     Prenatal Vit-Fe Fumarate-FA (Prenatal 27-1) 27-1 MG tablet tablet, Take 1 tablet by mouth Daily. (Patient not taking: Reported on 5/5/2025), Disp: , Rfl:     Physical Exam:     General Appearance:    Alert, cooperative, in no acute distress   Head:    Normocephalic, without obvious abnormality, atraumatic   Eyes:            Lids and lashes normal, conjunctivae and sclerae normal, no   icterus, no pallor, corneas clear, PERRLA   Throat:   No oral lesions, no thrush, oral mucosa moist   Lungs:     Clear to auscultation,respirations regular, even and                  unlabored    Heart:    Regular rhythm and normal rate, normal S1 and S2, no            murmur, no gallop, no rub, no click   Abdomen:     Normal bowel sounds, no masses, no organomegaly, soft        non-tender, non-distended, no guarding   Extremities:   Moves all extremities well, no edema, no cyanosis, no             redness   Pulses:   Pulses palpable and equal bilaterally   Skin:   No bleeding, bruising or rash   Lymphatics: Left axilla with dense tissue, but no discretely palpable adenopathy.    Neurologic:   Cranial nerves 2 - 12 grossly intact, sensation intact, DTR       present and equal bilaterally        Results Review:   I reviewed the patient's new clinical " results.  I reviewed the patient's new imaging results and agree with the interpretation.     Review of Systems was reviewed and confirmed as accurate as documented by the MA.    ASSESSMENT/PLAN:    1. Lymphadenopathy, axillary      Patient was seen today for follow up of left axillary ultrasound for previously diagnosed lymphadenopathy last year. We were going to do an 8 week follow up ultrasound after biopsy last summer, but patient was lost to follow up. She did finally get her repeat ultrasound in April this year and she does still have persistent lymphadenopathy and now has three enlarged nodes. I discussed a few options including lymphadenectomy which would be the most definitive to rule out lymphoma/malignancy versus core needle biopsy versus continued observation until after she stops breast feeding in the coming months. Patient is relatively low risk for a malignancy, but I think proceeding with lymphadenectomy would be reasonable. Patient would like to think about it and will call back to schedule. The procedure and risks including bleeding, infection, damage to surrounding structures, and need for additional procedures were described.     Electronically signed by Laurie Lares DO  05/05/25

## 2025-05-05 ENCOUNTER — OFFICE VISIT (OUTPATIENT)
Dept: SURGERY | Facility: CLINIC | Age: 30
End: 2025-05-05
Payer: COMMERCIAL

## 2025-05-05 VITALS
HEART RATE: 90 BPM | HEIGHT: 62 IN | TEMPERATURE: 96.8 F | WEIGHT: 141.1 LBS | BODY MASS INDEX: 25.96 KG/M2 | OXYGEN SATURATION: 98 % | DIASTOLIC BLOOD PRESSURE: 64 MMHG | RESPIRATION RATE: 18 BRPM | SYSTOLIC BLOOD PRESSURE: 118 MMHG

## 2025-05-05 DIAGNOSIS — R59.0 LYMPHADENOPATHY, AXILLARY: Primary | ICD-10-CM

## 2025-05-05 PROCEDURE — 99214 OFFICE O/P EST MOD 30 MIN: CPT | Performed by: STUDENT IN AN ORGANIZED HEALTH CARE EDUCATION/TRAINING PROGRAM

## 2025-06-10 ENCOUNTER — OFFICE VISIT (OUTPATIENT)
Dept: INTERNAL MEDICINE | Facility: CLINIC | Age: 30
End: 2025-06-10
Payer: COMMERCIAL

## 2025-06-10 VITALS
WEIGHT: 137 LBS | HEART RATE: 88 BPM | OXYGEN SATURATION: 99 % | BODY MASS INDEX: 25.21 KG/M2 | RESPIRATION RATE: 18 BRPM | DIASTOLIC BLOOD PRESSURE: 82 MMHG | SYSTOLIC BLOOD PRESSURE: 118 MMHG | TEMPERATURE: 98 F | HEIGHT: 62 IN

## 2025-06-10 DIAGNOSIS — Z13.228 SCREENING FOR ENDOCRINE, METABOLIC AND IMMUNITY DISORDER: ICD-10-CM

## 2025-06-10 DIAGNOSIS — Z13.29 SCREENING FOR ENDOCRINE, METABOLIC AND IMMUNITY DISORDER: ICD-10-CM

## 2025-06-10 DIAGNOSIS — R59.0 LYMPHADENOPATHY, AXILLARY: ICD-10-CM

## 2025-06-10 DIAGNOSIS — Z13.0 SCREENING FOR ENDOCRINE, METABOLIC AND IMMUNITY DISORDER: ICD-10-CM

## 2025-06-10 DIAGNOSIS — E66.3 OVERWEIGHT (BMI 25.0-29.9): ICD-10-CM

## 2025-06-10 DIAGNOSIS — Z00.00 ANNUAL PHYSICAL EXAM: Primary | ICD-10-CM

## 2025-06-10 NOTE — PROGRESS NOTES
Subjective   Janet Francis is a 29 y.o. female and is here for a comprehensive physical exam. The patient reports no problems.    HPI: here today for annual physical with no acute complaints.   Following up with general surgery in regards to lymphadenopathy. Plans to wean breastfeeding and follow-up for repeat imaging.      Health Habits:  Eye exam within last 2 years? No, will schedule.  Dental exam every 6 months? No, will schedule.   Exercise habits: no structured exercise.   Healthy diet? Balanced diet     The ASCVD Risk score (Sharmin DK, et al., 2019) failed to calculate for the following reasons:    The 2019 ASCVD risk score is only valid for ages 40 to 79      Do you take any herbs or supplements that were not prescribed by a doctor? Yes- elderberry and magnesium   Are you taking calcium supplements? No  Are you taking aspirin daily? No     History:  LMP: 2025  Menopause: No  Last pap date:    Abnormal pap? no  Family history of breast or ovarian cancer: yes- 1st cousin         OB History    Para Term  AB Living   2 2 2   2   SAB IAB Ectopic Molar Multiple Live Births       0 2      # Outcome Date GA Lbr Reinaldo/2nd Weight Sex Type Anes PTL Lv   2 Term 23 40w2d  4235 g (9 lb 5.4 oz) M Vag-Spont Local N NAOMI   1 Term 14 40w0d  3062 g (6 lb 12 oz) F Vag-Spont EPI N NAOMI      Birth Comments: Dr. Montenegro in [place]     Sexual activity questions deferred to the physician.        The following portions of the patient's history were reviewed and updated as appropriate: She  has a past medical history of ADHD (attention deficit hyperactivity disorder), Anxiety, Depression, and Murmur.  She does not have any pertinent problems on file.  She  has a past surgical history that includes Tonsillectomy.  Her family history includes Alcohol abuse in her father; Diabetes in her father; Heart attack in her father; Heart disease in her father; Hyperlipidemia in her father;  "Hypertension in her father; Thyroid disease in her mother and another family member.  She  reports that she has never smoked. She has never been exposed to tobacco smoke. She has never used smokeless tobacco. She reports that she does not currently use alcohol. She reports that she does not use drugs.  Current Outpatient Medications   Medication Sig Dispense Refill    Prenatal Vit-Fe Fumarate-FA (Prenatal 27-1) 27-1 MG tablet tablet Take 1 tablet by mouth Daily. (Patient not taking: Reported on 6/7/2024)       No current facility-administered medications for this visit.       Review of Systems  Review of Systems   Constitutional:  Negative for appetite change, fatigue and fever.   HENT:  Positive for swollen glands. Negative for congestion, sore throat and trouble swallowing.    Eyes:  Negative for blurred vision and visual disturbance.   Respiratory:  Negative for cough, shortness of breath and wheezing.    Cardiovascular:  Negative for chest pain, palpitations and leg swelling.   Gastrointestinal:  Negative for abdominal pain, blood in stool, constipation, diarrhea and nausea.   Endocrine: Negative for polydipsia, polyphagia and polyuria.   Genitourinary:  Negative for dysuria.   Musculoskeletal:  Negative for arthralgias, back pain and myalgias.   Skin:  Negative for rash.   Neurological:  Negative for dizziness, weakness, light-headedness and headache.   Psychiatric/Behavioral:  Negative for sleep disturbance and depressed mood. The patient is not nervous/anxious.          Objective   /82   Pulse 88   Temp 98 °F (36.7 °C) (Infrared)   Resp 18   Ht 157.5 cm (62.01\")   Wt 62.1 kg (137 lb)   SpO2 99%   BMI 25.05 kg/m²   Physical Exam  Vitals and nursing note reviewed.   Constitutional:       General: She is not in acute distress.     Appearance: Normal appearance.   HENT:      Right Ear: Tympanic membrane and ear canal normal.      Left Ear: Tympanic membrane and ear canal normal.      Nose: Nose " normal.      Mouth/Throat:      Mouth: Mucous membranes are moist.      Pharynx: Oropharynx is clear. No posterior oropharyngeal erythema.   Eyes:      Extraocular Movements: Extraocular movements intact.      Pupils: Pupils are equal, round, and reactive to light.   Neck:      Thyroid: No thyroid mass or thyromegaly.      Vascular: No carotid bruit.   Cardiovascular:      Rate and Rhythm: Normal rate and regular rhythm.      Pulses: Normal pulses.      Heart sounds: Normal heart sounds. No murmur heard.  Pulmonary:      Effort: Pulmonary effort is normal.      Breath sounds: Normal breath sounds. No wheezing.   Abdominal:      General: Bowel sounds are normal. There is no distension.      Palpations: Abdomen is soft. There is no mass.      Tenderness: There is no abdominal tenderness.   Musculoskeletal:         General: No deformity. Normal range of motion.      Cervical back: Normal range of motion and neck supple. No muscular tenderness.   Lymphadenopathy:      Head:      Right side of head: No submandibular, tonsillar, preauricular or posterior auricular adenopathy.      Left side of head: No submandibular, tonsillar, preauricular or posterior auricular adenopathy.      Cervical: No cervical adenopathy.   Skin:     General: Skin is warm and dry.      Capillary Refill: Capillary refill takes less than 2 seconds.      Findings: No bruising or rash.   Neurological:      Mental Status: She is alert and oriented to person, place, and time.      Gait: Gait normal.      Deep Tendon Reflexes: Reflexes normal.   Psychiatric:         Mood and Affect: Mood normal.         Behavior: Behavior normal.            Assessment & Plan   Healthy female exam.    Diagnosis Plan   1. Annual physical exam  CBC No Differential    Comprehensive metabolic panel    TSH Rfx On Abnormal To Free T4    Vitamin B12    Iron and TIBC    Ferritin    Lipid panel    Preventative maintenance discussed during visit and information provided in AVS.  Update screening labs.       2. Overweight (BMI 25.0-29.9)  CBC No Differential    Comprehensive metabolic panel    TSH Rfx On Abnormal To Free T4    Vitamin B12    Iron and TIBC    Ferritin    Lipid panel    Healthy diet and exercise recommendations provided.      3. Lymphadenopathy, axillary  CBC No Differential    Comprehensive metabolic panel    TSH Rfx On Abnormal To Free T4    Vitamin B12    Iron and TIBC    Ferritin    Lipid panel    Keep scheduled follow-up with general surgery.       4. Screening for endocrine, metabolic and immunity disorder  CBC No Differential    Comprehensive metabolic panel    TSH Rfx On Abnormal To Free T4    Vitamin B12    Iron and TIBC    Ferritin    Lipid panel        2. Patient Counseling:  --Nutrition: Recommend high protein low carbohydrate diet, portion control, moderate sodium and caffeine intake, and 1 g folic acid supplementation if childbearing age.  --Discussed the issue of calcium supplement, and the daily use of baby aspirin if applicable.               --Screening mammogram to start at age 40 and repeat every 1-2 years. Monthly breast exams by patient recommended.   --Exercise: discussed impact of regular exercise on healthy with goal of 30 minutes of moderate-intensity exercise 4-5 times/week.  --Substance Abuse: Discussed cessation/primary prevention of tobacco (if applicable), alcohol, or other drug use (if applicable); driving or other dangerous activities under the influence; availability of treatment for abuse.   --Sexuality: Discussed sexually transmitted diseases, partner selection, use of condoms, avoidance of unintended pregnancy  and contraceptive alternatives.   --Injury prevention: Discussed safety belts, safety helmets, smoke detector, smoking near bedding or upholstery, avoid distracted driving (texting/phone use).  --Dental health: Discussed importance of regular tooth brushing, flossing, and dental visits every 6 months.  --Immunizations reviewed and  recommend staying up-to-date.   --Discussed benefits of screening colonoscopy vs cologuard testing for low risk individuals (if applicable).  --After hours service discussed with patient    3. Discussed the patient's BMI with her.  The BMI is above average; BMI management plan is completed  BMI is >= 25 and <30. (Overweight) The following options were offered after discussion;: exercise counseling/recommendations, nutrition counseling/recommendations, and Information on healthy weight added to patient's after visit summary.     4. Return in about 1 year (around 6/10/2026) for Annual physical.  Mary Matias, ALFRED  06/10/2025  13:50 EDT

## 2025-08-20 ENCOUNTER — TELEPHONE (OUTPATIENT)
Dept: PSYCHIATRY | Facility: CLINIC | Age: 30
End: 2025-08-20

## 2025-08-20 ENCOUNTER — OFFICE VISIT (OUTPATIENT)
Dept: PSYCHIATRY | Facility: CLINIC | Age: 30
End: 2025-08-20
Payer: COMMERCIAL

## 2025-08-20 VITALS
BODY MASS INDEX: 25.51 KG/M2 | HEIGHT: 62 IN | DIASTOLIC BLOOD PRESSURE: 84 MMHG | WEIGHT: 138.6 LBS | SYSTOLIC BLOOD PRESSURE: 126 MMHG | HEART RATE: 106 BPM | OXYGEN SATURATION: 98 %

## 2025-08-20 DIAGNOSIS — Z53.21 PATIENT LEFT WITHOUT BEING SEEN: Primary | ICD-10-CM

## 2025-08-26 ENCOUNTER — TELEPHONE (OUTPATIENT)
Dept: INTERNAL MEDICINE | Facility: CLINIC | Age: 30
End: 2025-08-26
Payer: COMMERCIAL